# Patient Record
Sex: FEMALE | Race: WHITE | Employment: STUDENT | URBAN - NONMETROPOLITAN AREA
[De-identification: names, ages, dates, MRNs, and addresses within clinical notes are randomized per-mention and may not be internally consistent; named-entity substitution may affect disease eponyms.]

---

## 2019-08-17 ENCOUNTER — OFFICE VISIT (OUTPATIENT)
Dept: FAMILY MEDICINE | Facility: OTHER | Age: 19
End: 2019-08-17
Attending: PHYSICIAN ASSISTANT
Payer: COMMERCIAL

## 2019-08-17 VITALS
BODY MASS INDEX: 21.54 KG/M2 | OXYGEN SATURATION: 99 % | HEART RATE: 88 BPM | WEIGHT: 137.25 LBS | SYSTOLIC BLOOD PRESSURE: 104 MMHG | RESPIRATION RATE: 16 BRPM | TEMPERATURE: 99 F | HEIGHT: 67 IN | DIASTOLIC BLOOD PRESSURE: 68 MMHG

## 2019-08-17 DIAGNOSIS — L03.116 CELLULITIS OF LEFT LOWER EXTREMITY: Primary | ICD-10-CM

## 2019-08-17 PROCEDURE — 99213 OFFICE O/P EST LOW 20 MIN: CPT | Performed by: FAMILY MEDICINE

## 2019-08-17 RX ORDER — SULFASALAZINE 500 MG/1
2 TABLET ORAL 2 TIMES DAILY WITH MEALS
Refills: 1 | COMMUNITY
Start: 2019-07-16

## 2019-08-17 RX ORDER — LEVOFLOXACIN 750 MG/1
750 TABLET, FILM COATED ORAL DAILY
Qty: 10 TABLET | Refills: 1 | Status: SHIPPED | OUTPATIENT
Start: 2019-08-17 | End: 2019-08-27

## 2019-08-17 SDOH — HEALTH STABILITY: MENTAL HEALTH: HOW OFTEN DO YOU HAVE A DRINK CONTAINING ALCOHOL?: NEVER

## 2019-08-17 ASSESSMENT — MIFFLIN-ST. JEOR: SCORE: 1425.19

## 2019-08-17 ASSESSMENT — PAIN SCALES - GENERAL: PAINLEVEL: SEVERE PAIN (7)

## 2019-08-17 NOTE — PROGRESS NOTES
Chief complaint painful swollen left lower extremity    HPI: Pleasant 19-year-old white female who is been doing rowing activities and as a consequence sustained some repeated minor trauma to the posterior left lower extremity and now the last day or so has noticed increased inflammation and swelling with a skin break that discolored and consistent with minor trauma and secondary bacterial infection.  The patient has otherwise been in good health.  [place]  Patient has had no allergies and is on no daily medications    Past medical history apparently Crohn's disease and is in remission remainder of medical history is unremarkable    Review of systems has not had a fever or any other constitutional symptoms associated with his leg problem.  She denies any trouble breathing and recently saw her cardiologist and was advised that she did not need to return for 3 years for her prolapsed mitral valve problem that they are following her for.    Examination afebrile and in no acute distress  Head neck grossly normal  Chest clear  Cardiovascular regular rate no murmur  Left lower extremity has a approximately 6 to 8 cm wide inflammatory change with a central necrotic area of discoloration with a ascending red macular eruption going superior towards the knee but not above the knee the patient has calf tenderness but no physical exam findings for DVT, negative Homans test and good sensorimotor vascular supply to the left foot    Assessment is an acute cellulitis secondary to minor trauma and because this is appears to be progressing we will treat this as though it is a infection with some mild early complication and the plan will be oral levofloxacin 750 mg for the next 7 to 10 days with a recheck in the clinic in 2 days consideration of injection however believe that levofloxacin will be quite effective for provider she also stays off of it keeps her leg elevated and we  closely monitor how she is doing.  If she develops high fever or becomes any worse to be immediately reevaluated

## 2019-08-17 NOTE — PATIENT INSTRUCTIONS
Patient Education     Discharge Instructions for Cellulitis  You have been diagnosed with cellulitis. This is an infection in the deepest layer of the skin. In some cases, the infection also affects the muscle. Cellulitis is caused by bacteria. The bacteria can enter the body through broken skin. This can happen with a cut, scratch, animal bite, or an insect bite that has been scratched. You may have been treated in the hospital with antibiotics and fluids. You will likely be given a prescription for antibiotics to take at home. This sheet will help you take care of yourself at home.  Home care  When you are home:    Take the prescribed antibiotic medicine you are given as directed until it is gone. Take it even if you feel better. It treats the infection and stops it from returning. Not taking all the medicine can make future infections hard to treat.    Keep the infected area clean.    When possible, raise the infected area above the level of your heart. This helps keep swelling down.    Talk with your healthcare provider if you are in pain. Ask what kind of over-the-counter medicine you can take for pain.    Apply clean bandages as advised.    Take your temperature once a day for a week.    Wash your hands often to prevent spreading the infection.  In the future, wash your hands before and after you touch cuts, scratches, or bandages. This will help prevent infection.   When to call your healthcare provider  Call your healthcare provider immediately if you have any of the following:    Difficulty or pain when moving the joints above or below the infected area    Discharge or pus draining from the area    Fever of 100.4 F (38 C) or higher, or as directed by your healthcare provider    Pain that gets worse in or around the infected     Redness that gets worse in or around the infected area, particularly if the area of redness expands to a wider area    Shaking chills    Swelling of the infected area    Vomiting    Date Last Reviewed: 8/1/2016 2000-2018 [employer]. [address] [place] [address], [place] [address]. All rights reserved. This information is not intended as a substitute for professional medical care. Always follow your healthcare professional's instructions.

## 2019-08-17 NOTE — NURSING NOTE
Patient presents to clinic with wound on outer left leg calf.  She is experiencing pain rated at 7, redness and swelling for past 4 days.  She was in a rowing competition.    Medication Reconciliation: complete    Lindy Ortega LPN